# Patient Record
(demographics unavailable — no encounter records)

---

## 2017-11-10 NOTE — CT
CT ANGIOGRAM THORAX WITH IV CONTRAST AND 3D RECONSTRUCTIONS:

 

Date: 11/10/17 

 

HISTORY:  

Chest pain that began last Thursday. Patient describes the chest pain as sharp and constant and loca
shelbi substernally. 

 

FINDINGS:

No filling defects are seen in the pulmonary arteries to suggest a pulmonary embolus. The thoracic a
ester is normal in caliber without evidence of an aortic dissection. 

 

There is soft tissue density in the anterior superior mediastinum likely related to residual thymus.
 

 

There is a tiny, 3.0 mm, noncalcified pulmonary nodule in the anterior aspect left lower lobe with a
n additional tiny, approximately 3.0 mm, pleural based nodular density. There is a small, approximat
ely 4.0 mm, pulmonary nodule also seen in the left lower lobe further inferiorly, as well as an judith
tional pleural based nodular density measuring 3-4 mm seen laterally within the left lower lobe. 

 

There are two noncalcified pulmonary  nodules within the right lower lobe, each measuring approximat
ely 5.0 mm. There is a smaller, approximately 3.0 mm, pulmonary nodule in the more inferior aspect o
f the right lower lobe. The exact etiology for these pulmonary nodules on this examination is uncert
ain. However, given multiplicity, a short interval follow-up examination is recommended. A few of th
ese small pulmonary nodules were seen in the inferior lung bases on the prior CT abdomen in 2014. 

 

There are mildly prominent right axillary lymph nodes seen, the largest measuring approximately 1.2 
cm in short axis dimension. This is overall nonspecific. 

 

 

 

IMPRESSION: 

1.  Multiple bilateral lower lobe pulmonary nodules measuring 5.0 mm or less which are of uncertain 
etiology. Some of these nodules were partially imaged on the prior CT abdomen on 04/22/14. However, 
given multiplicity of pulmonary nodules, follow-up evaluation in 4-6 months is recommended. 

 

2.  Mildly prominent nonspecific right axillary lymph nodes. 

 

3.  No CT evidence of a pulmonary embolus. 

 

Above findings discussed with Dr. Cardenas in the emergency department on 11/10/17 at 1409 hours. 

 

CODE CR. 

 

 

POS: WILMA

## 2019-04-23 NOTE — RAD
3 views right ankle:

4/23/2019



COMPARISON: None



HISTORY: Twisted right ankle, trauma, pain



FINDINGS: The talar dome and ankle mortise are intact. There is mild soft tissue swelling seen anteri
adrian. There is mild enthesophyte formation at the insertion of the Achilles tendon. Midfoot dorsal

degenerative change noted with joint space narrowing and osteophyte formation. No displaced fracture 
or dislocation.



IMPRESSION: No displaced fracture or evidence of dislocation.



Reported By: Josh Benavides 

Electronically Signed:  4/23/2019 4:59 PM